# Patient Record
Sex: MALE | NOT HISPANIC OR LATINO | URBAN - METROPOLITAN AREA
[De-identification: names, ages, dates, MRNs, and addresses within clinical notes are randomized per-mention and may not be internally consistent; named-entity substitution may affect disease eponyms.]

---

## 2025-04-09 NOTE — PROGRESS NOTES
"Subjective   Mateusz Nieto is a 34 y.o. male who presents for NPV TO ESTABLISH PCP CARE and FOR ANNUAL PHYSICAL EXAM.    HPI:      34 y.o. male SMOKER (1.5 ppd x  15 years; quit 2020) who presents for NPV TO ESTABLISH PCP CARE and FOR ANNUAL PHYSICAL EXAM.       [facility] records reviewed    PMHx:  ADHD; previously on concerta over 10 years ago bit had stopped  Ex-smoker       Healthcare Providers:  Psychiatry:  none          Preventive Health Services:  -Last physical: 4/11/25  -last colonoscopy/cologuard: NEVER; paternal GM and GF 60s when diagnosed and passed of colon cancer; Denies any other Fhx colon cancer==> DUE at AGE 43 yo  -last STI screening: ?  -Hep C screening: ?       Immunizations:  -Childhood vaccines: completed per patient  -updated COVID spike vaccine: NOW DUE  -TDAP: ?; NOW DUE      Immunization History   Administered Date(s) Administered    COVID-19, mRNA, LNP-S, PF, 30 mcg/0.3 mL dose 01/10/2022    Moderna SARS-CoV-2 Vaccination 04/07/2021, 05/07/2021               Today Mateusz  reports:     - doing and feeling well     -not taking any prescription medications         -previously on concerta for ADHD but has been off medication for 15+ years and is interested in restarting.  He reports that he previously was prescribed wellbutrin that was not helpful and did not follow up to establish care with psychiatrist for ADHD evaluation          Today  Mateusz has no other reported complaints, issues, or problems.  ROS is NEG for HEADACHE, NAUSEA, VOMITING, DIARRHEA, CHEST PAIN, SOB, and BLEEDING.   Review of systems (12) is negative for all systems except for any identified issues in HPI above.          Objective     /80   Pulse 76   Temp 36.6 °C (97.9 °F)   Ht 1.765 m (5' 9.5\")   Wt 123 kg (272 lb 3.2 oz)   SpO2 95%   BMI 39.62 kg/m²      Physical Examination: patient declined chaperone       GENERAL           General Appearance: pleasant, well-appearing, well-developed, well-hydrated, " well-nourished, well-groomed.        HEENT           NECK supple, Neg for adneopathy no thyroid enlargement or nodules, Oropharynx normal no exudates. Ears: TMs intact, normal, no effusions       EYES           Pupils: PERRLA, no photophobia.        HEART           Rate and Rhythm regular rate and rhythm. Heart sounds: normal S1S2. Murmurs: none.        LUNGS           Effort: Normal chest wall, no pectus, Normal air entry all fields, Clear to IPPA, RR<16 with no use of accessory muscles.        BACK           General: unremarkable, no spinal tenderness or rashes.        ABDOMEN           General: soft NDNT to palpation, normal to inspection, no HSM, neg for LKKS or masses and BSs heard in all quadrants                  LYMPHATICS           Cervical: none. Axillary: none.        MUSCULOSKELETAL           gross abnormalities no gross abnormalities, no joint redness or swelling.        EXTREMITIES           Varicose veins: not present. Pulses: 2+ bilateral. Clubbing: none. Cyanosis: no.        NEUROLOGICAL           Orientation: alert and oriented x 3. Grossly normal: yes. Plantars: downgoing bilaterally. Muscle Bulk: normal . Cranial Nerves: CN's II-XII grossly intact.        PSYCHOLOGY           Affect: appropriate. Mood: pleasant.       SKIN: multiple atypical nevi on back, no ulceration and no bleeding. no rashes. No lesions.        (patient declined chaperone): Penis: normal circumcised phallus no lesions. Scrotum: no lesions. Testicles: descended bilaterally, non-tender to palpation, no palpable masses or inguinal hernias. No inguinal LAD.      Assessment/Plan   Problem List Items Addressed This Visit    None  Visit Diagnoses       Annual physical exam    -  Primary    Lipid screening        Diabetes mellitus screening        Attention deficit hyperactivity disorder (ADHD), unspecified ADHD type        High dependence on smoking        Vitamin D deficiency        Encounter for hepatitis C screening test for  low risk patient        Routine screening for STI (sexually transmitted infection)        Immunization counseling              Annual Physical Exam:  -labs ordered (see A/P above)    ADHD: off medications for 15+ years. No red flag signs/sxs  -referral to psychiatry for evaluation and treatment  -discussed with patient that he needs to be evaluated by psychiatry who will prescribe stimulant medication if indicated    Atypical Nevi:  -referral to dermatology ordered    BMI: 39  -weight loss encouraged  -lipid panel and HgBA1c ordered  -low carbohydrate, low fat diet, regularly exercise, and limit alcohol intake     Lipid Screening  -lipid panel ordered     Diabetes Screening  -HgBA1c ordered     Vitamin D deficiency  -Vit D levels ordered     Hep C screening  -Hep C antibody ordered     STI Screening: patient declined HIV, syphilis, GC/CT/trich ordered      Counseling:      Medication education:        Education:  The patient is counseled regarding potential side-effects of all new medications        Understanding:  Patient expressed understanding        Adherence:  No barriers to adherence identified        Immunizations Counseling  TDAP now due==> declined  -recommend updated COVID spike vaccine that can be obtained at local pharmacy     FOLLOW-UP: 4 weeks to discuss and review test results    I have personally reviewed all available pertinent labs, imaging, and consult notes with the patient.     Discussed recommended plan of care with patient. Patient expressed understanding and agreement with plan of care. All of patient's  questions were answered at the time. Patient had no additional questions at the time.           Leilani Diehl M.D.

## 2025-04-11 ENCOUNTER — OFFICE VISIT (OUTPATIENT)
Dept: PRIMARY CARE | Facility: CLINIC | Age: 35
End: 2025-04-11
Payer: COMMERCIAL

## 2025-04-11 VITALS
HEIGHT: 70 IN | SYSTOLIC BLOOD PRESSURE: 124 MMHG | HEART RATE: 76 BPM | WEIGHT: 272.2 LBS | TEMPERATURE: 97.9 F | DIASTOLIC BLOOD PRESSURE: 80 MMHG | OXYGEN SATURATION: 95 % | BODY MASS INDEX: 38.97 KG/M2

## 2025-04-11 DIAGNOSIS — Z13.1 DIABETES MELLITUS SCREENING: ICD-10-CM

## 2025-04-11 DIAGNOSIS — Z13.220 LIPID SCREENING: ICD-10-CM

## 2025-04-11 DIAGNOSIS — Z00.00 ANNUAL PHYSICAL EXAM: Primary | ICD-10-CM

## 2025-04-11 DIAGNOSIS — Z71.85 IMMUNIZATION COUNSELING: ICD-10-CM

## 2025-04-11 DIAGNOSIS — Z11.3 ROUTINE SCREENING FOR STI (SEXUALLY TRANSMITTED INFECTION): ICD-10-CM

## 2025-04-11 DIAGNOSIS — F90.9 ATTENTION DEFICIT HYPERACTIVITY DISORDER (ADHD), UNSPECIFIED ADHD TYPE: ICD-10-CM

## 2025-04-11 DIAGNOSIS — D22.9 ATYPICAL NEVI: ICD-10-CM

## 2025-04-11 DIAGNOSIS — Z11.59 ENCOUNTER FOR HEPATITIS C SCREENING TEST FOR LOW RISK PATIENT: ICD-10-CM

## 2025-04-11 DIAGNOSIS — E55.9 VITAMIN D DEFICIENCY: ICD-10-CM

## 2025-04-11 ASSESSMENT — PAIN SCALES - GENERAL: PAINLEVEL_OUTOF10: 0-NO PAIN

## 2025-04-11 ASSESSMENT — COLUMBIA-SUICIDE SEVERITY RATING SCALE - C-SSRS
6. HAVE YOU EVER DONE ANYTHING, STARTED TO DO ANYTHING, OR PREPARED TO DO ANYTHING TO END YOUR LIFE?: NO
1. IN THE PAST MONTH, HAVE YOU WISHED YOU WERE DEAD OR WISHED YOU COULD GO TO SLEEP AND NOT WAKE UP?: NO
2. HAVE YOU ACTUALLY HAD ANY THOUGHTS OF KILLING YOURSELF?: NO

## 2025-04-11 ASSESSMENT — PATIENT HEALTH QUESTIONNAIRE - PHQ9
2. FEELING DOWN, DEPRESSED OR HOPELESS: NOT AT ALL
1. LITTLE INTEREST OR PLEASURE IN DOING THINGS: NOT AT ALL
SUM OF ALL RESPONSES TO PHQ9 QUESTIONS 1 AND 2: 0

## 2025-04-21 LAB
APPEARANCE UR: CLEAR
BILIRUB UR QL STRIP: NEGATIVE
COLOR UR: YELLOW
GLUCOSE UR QL STRIP: NEGATIVE
HGB UR QL STRIP: NEGATIVE
KETONES UR QL STRIP: NEGATIVE
LEUKOCYTE ESTERASE UR QL STRIP: NEGATIVE
NITRITE UR QL STRIP: NEGATIVE
PH UR STRIP: 5.5 [PH] (ref 5–8)
PROT UR QL STRIP: NEGATIVE
SP GR UR STRIP: 1.02 (ref 1–1.03)

## 2025-04-22 LAB
25(OH)D3+25(OH)D2 SERPL-MCNC: 24 NG/ML (ref 30–100)
ALBUMIN SERPL-MCNC: 4.6 G/DL (ref 3.6–5.1)
ALP SERPL-CCNC: 113 U/L (ref 36–130)
ALT SERPL-CCNC: 24 U/L (ref 9–46)
ANION GAP SERPL CALCULATED.4IONS-SCNC: 7 MMOL/L (CALC) (ref 7–17)
AST SERPL-CCNC: 18 U/L (ref 10–40)
BASOPHILS # BLD AUTO: 60 CELLS/UL (ref 0–200)
BASOPHILS NFR BLD AUTO: 0.8 %
BILIRUB SERPL-MCNC: 0.4 MG/DL (ref 0.2–1.2)
BUN SERPL-MCNC: 18 MG/DL (ref 7–25)
CALCIUM SERPL-MCNC: 9.4 MG/DL (ref 8.6–10.3)
CHLORIDE SERPL-SCNC: 101 MMOL/L (ref 98–110)
CHOLEST SERPL-MCNC: 169 MG/DL
CHOLEST/HDLC SERPL: 3.8 (CALC)
CO2 SERPL-SCNC: 30 MMOL/L (ref 20–32)
CREAT SERPL-MCNC: 1 MG/DL (ref 0.6–1.26)
EGFRCR SERPLBLD CKD-EPI 2021: 100 ML/MIN/1.73M2
EOSINOPHIL # BLD AUTO: 203 CELLS/UL (ref 15–500)
EOSINOPHIL NFR BLD AUTO: 2.7 %
ERYTHROCYTE [DISTWIDTH] IN BLOOD BY AUTOMATED COUNT: 13.1 % (ref 11–15)
EST. AVERAGE GLUCOSE BLD GHB EST-MCNC: 120 MG/DL
EST. AVERAGE GLUCOSE BLD GHB EST-SCNC: 6.6 MMOL/L
GLUCOSE SERPL-MCNC: 90 MG/DL (ref 65–99)
HBA1C MFR BLD: 5.8 %
HCT VFR BLD AUTO: 48 % (ref 38.5–50)
HCV AB SERPL QL IA: NORMAL
HDLC SERPL-MCNC: 44 MG/DL
HGB BLD-MCNC: 15.8 G/DL (ref 13.2–17.1)
LDLC SERPL CALC-MCNC: 102 MG/DL (CALC)
LYMPHOCYTES # BLD AUTO: 2213 CELLS/UL (ref 850–3900)
LYMPHOCYTES NFR BLD AUTO: 29.5 %
MCH RBC QN AUTO: 28.5 PG (ref 27–33)
MCHC RBC AUTO-ENTMCNC: 32.9 G/DL (ref 32–36)
MCV RBC AUTO: 86.5 FL (ref 80–100)
MONOCYTES # BLD AUTO: 503 CELLS/UL (ref 200–950)
MONOCYTES NFR BLD AUTO: 6.7 %
NEUTROPHILS # BLD AUTO: 4523 CELLS/UL (ref 1500–7800)
NEUTROPHILS NFR BLD AUTO: 60.3 %
NONHDLC SERPL-MCNC: 125 MG/DL (CALC)
PLATELET # BLD AUTO: 213 THOUSAND/UL (ref 140–400)
PMV BLD REES-ECKER: 10.8 FL (ref 7.5–12.5)
POTASSIUM SERPL-SCNC: 5 MMOL/L (ref 3.5–5.3)
PROT SERPL-MCNC: 7.2 G/DL (ref 6.1–8.1)
RBC # BLD AUTO: 5.55 MILLION/UL (ref 4.2–5.8)
SODIUM SERPL-SCNC: 138 MMOL/L (ref 135–146)
TRIGL SERPL-MCNC: 125 MG/DL
TSH SERPL-ACNC: 1.6 MIU/L (ref 0.4–4.5)
WBC # BLD AUTO: 7.5 THOUSAND/UL (ref 3.8–10.8)

## 2025-05-18 NOTE — PROGRESS NOTES
Subjective   Mateusz Nieto is a 35 y.o. male  EX-SMOKER (1.5 ppd x 15 years; quit 2020) who presents for follow up to discuss and review test results.    HPI:    35 y.o. male  EX-SMOKER (1.5 ppd x 15 years; quit 2020) who presents for follow up to discuss and review test results.        [facility] records reviewed    Last seen by me on 4/11/25 for NPV TO ESTABLISH PCP CARE and FOR ANNUAL PHYSICAL EXAM. At visit:    Annual Physical Exam:  -labs ordered (see A/P above)     ADHD: off medications for 15+ years. No red flag signs/sxs  -referral to psychiatry for evaluation and treatment  -discussed with patient that he needs to be evaluated by psychiatry who will prescribe stimulant medication if indicated     Atypical Nevi:  -referral to dermatology ordered     BMI: 39  -weight loss encouraged  -lipid panel and HgBA1c ordered  -low carbohydrate, low fat diet, regularly exercise, and limit alcohol intake      Lipid Screening  -lipid panel ordered     Diabetes Screening  -HgBA1c ordered     Vitamin D deficiency  -Vit D levels ordered     Hep C screening  -Hep C antibody ordered     STI Screening: patient declined HIV, syphilis, GC/CT/trich ordered           Immunizations Counseling  TDAP now due==> declined  -recommend updated COVID spike vaccine that can be obtained at local pharmacy     FOLLOW-UP: 4 weeks to discuss and review test results          PMHx:  Prediabetes  hyperlipidemia  ADHD; previously on concerta over 10 years ago bit had stopped  Ex-smoker        Healthcare Providers:  Psychiatry:  none           Preventive Health Services:  -Last physical: 4/11/25  -last colonoscopy/cologuard: NEVER; paternal GM and GF 60s when diagnosed and passed of colon cancer; Denies any other Fhx colon cancer==> DUE at AGE 45 yo  -last STI screening: declined 4/11/25  -Hep C screening: negative 4/11/25        Immunizations:  -Childhood vaccines: completed per patient  -updated COVID spike vaccine: NOW DUE  -TDAP: ?; NOW DUE         Immunization History   Administered Date(s) Administered    COVID-19, mRNA, LNP-S, PF, 30 mcg/0.3 mL dose 01/10/2022    Moderna SARS-CoV-2 Vaccination 04/07/2021, 05/07/2021           Interval History:    -completed labs ordered 4/11/25. Based on test results:     -Vit D is slightly low. start OTC Vit D3 2000 units daily.     -prediabetes     -elevated LDL cholesterol is slightly elevated.    -TSH, CMP, CBC, UA normal.       Today Mateusz  reports:     - doing and feeling well     -not taking any prescription medications     -following a low carbohydrate, low cholesterol, and low fat diet         -previously on concerta for ADHD but has been off medication for 15+ years and is interested in restarting.  At last visit He reported that he previously was prescribed wellbutrin that was not helpful and did not follow up to establish care with psychiatrist for ADHD evaluation, and was referred to psychiatry for evaluation.           Today he has no other reported complaints, issues, or problems.  ROS is NEG for HEADACHE, NAUSEA, VOMITING, DIARRHEA, CHEST PAIN, SOB, and BLEEDING.   Review of systems (12) is negative for all systems except for any identified issues in HPI above.       Objective     /88   Pulse 80   Temp 36.3 °C (97.4 °F)   SpO2 97%      Physical Examination:       GENERAL           General Appearance: well-appearing, well-developed, well-hydrated, well-nourished, no acute distress.        HEENT           NECK supple, no masses or thyromegaly, no carotid bruit.        EYES           Extraocular Movements: normal, bilateral eyes SLIME, no conjunctival injection.        HEART           Rate and Rhythm regular rate and rhythm. Heart sounds: normal S1S2, no S3 or S4. Murmurs: none.        CHEST           Breath sounds: Clear to IPPA, RR<16 no use of accessory muscles.        ABDOMEN           General: Neg for LKKS or masses, no scleral icterus or jaundice.        MUSCULOSKELETAL           Joints  Demonstration: Neg for erythema, swelling or joint deformities. gross abnormalities no gross abnormalities.        EXTREMITIES           Lower Extremities: Neg for cyanosis, clubbing or edema.       Assessment/Plan   Problem List Items Addressed This Visit       Hyperlipidemia    Relevant Orders    Referral to Nutrition Services    Atypical nevi    Prediabetes - Primary    Relevant Orders    Referral to Nutrition Services    Vitamin D deficiency     Other Visit Diagnoses         Attention deficit hyperactivity disorder (ADHD), unspecified ADHD type          Class 2 obesity without serious comorbidity with body mass index (BMI) of 39.0 to 39.9 in adult, unspecified obesity type        Relevant Orders    Referral to Nutrition Services      Immunization counseling          Encounter for administration of vaccine        Relevant Orders    Tdap vaccine, age 7 years and older  (BOOSTRIX)          Prediabetes: HgBA1c 5.8 (4/21/25)  - Low carbohydrate, low-fat, low-cholesterol diet, regularly exercise, and limit alcohol intake  -Referral to nutrition ordered  - Hemoglobin A1c next due 7/21/2025    Hyperlipidemia:  -Low carbohydrate, low-fat, low-cholesterol diet, regularly exercise, and limit alcohol intake    ADHD: off medications for 15+ years. No red flag signs/sxs  -referral to psychiatry ordered  4/11/25 for evaluation and treatment  -discussed with patient that he needs to be evaluated by psychiatry who will prescribe stimulant medication if indicated and appropriate     Atypical Nevi:  -referral to dermatology ordered 4/11/25     BMI: 39  -weight loss encouraged  -Referral to nutrition ordered  -declined referral to endocrinology weight management  -low carbohydrate, low fat diet, regularly exercise, and limit alcohol intake        Vitamin D deficiency  - Start OTC Vit D 2000 units daily           Counseling:      Medication education:        Education:  The patient is counseled regarding potential side-effects of  all new medications        Understanding:  Patient expressed understanding        Adherence:  No barriers to adherence identified        Immunizations Counseling  TDAP now due==> received today  -recommend updated COVID spike vaccine that can be obtained at local pharmacy     FOLLOW-UP: 8 weeks HgBA1c check     I have personally reviewed all available pertinent labs, imaging, and consult notes with the patient.     Discussed recommended plan of care with patient. Patient expressed understanding and agreement with plan of care. All of patient's  questions were answered at the time. Patient had no additional questions at the time.            Leilani Diehl MD, PhD

## 2025-05-20 ENCOUNTER — OFFICE VISIT (OUTPATIENT)
Dept: PRIMARY CARE | Facility: CLINIC | Age: 35
End: 2025-05-20
Payer: COMMERCIAL

## 2025-05-20 VITALS
HEART RATE: 80 BPM | OXYGEN SATURATION: 97 % | TEMPERATURE: 97.4 F | SYSTOLIC BLOOD PRESSURE: 116 MMHG | DIASTOLIC BLOOD PRESSURE: 88 MMHG

## 2025-05-20 DIAGNOSIS — E55.9 VITAMIN D DEFICIENCY: ICD-10-CM

## 2025-05-20 DIAGNOSIS — F90.9 ATTENTION DEFICIT HYPERACTIVITY DISORDER (ADHD), UNSPECIFIED ADHD TYPE: ICD-10-CM

## 2025-05-20 DIAGNOSIS — E66.812 CLASS 2 OBESITY WITHOUT SERIOUS COMORBIDITY WITH BODY MASS INDEX (BMI) OF 39.0 TO 39.9 IN ADULT, UNSPECIFIED OBESITY TYPE: ICD-10-CM

## 2025-05-20 DIAGNOSIS — Z23 ENCOUNTER FOR ADMINISTRATION OF VACCINE: ICD-10-CM

## 2025-05-20 DIAGNOSIS — R73.03 PREDIABETES: Primary | ICD-10-CM

## 2025-05-20 DIAGNOSIS — E78.2 MIXED HYPERLIPIDEMIA: ICD-10-CM

## 2025-05-20 DIAGNOSIS — D22.9 ATYPICAL NEVI: ICD-10-CM

## 2025-05-20 DIAGNOSIS — Z71.85 IMMUNIZATION COUNSELING: ICD-10-CM

## 2025-05-20 PROBLEM — E78.5 HYPERLIPIDEMIA: Status: ACTIVE | Noted: 2025-05-20

## 2025-05-20 PROCEDURE — 90471 IMMUNIZATION ADMIN: CPT | Performed by: FAMILY MEDICINE

## 2025-05-20 PROCEDURE — 1036F TOBACCO NON-USER: CPT | Performed by: FAMILY MEDICINE

## 2025-05-20 PROCEDURE — 90715 TDAP VACCINE 7 YRS/> IM: CPT | Performed by: FAMILY MEDICINE

## 2025-05-20 PROCEDURE — 99214 OFFICE O/P EST MOD 30 MIN: CPT | Performed by: FAMILY MEDICINE

## 2025-05-20 ASSESSMENT — PATIENT HEALTH QUESTIONNAIRE - PHQ9
2. FEELING DOWN, DEPRESSED OR HOPELESS: NOT AT ALL
SUM OF ALL RESPONSES TO PHQ9 QUESTIONS 1 AND 2: 0
1. LITTLE INTEREST OR PLEASURE IN DOING THINGS: NOT AT ALL

## 2025-05-20 ASSESSMENT — COLUMBIA-SUICIDE SEVERITY RATING SCALE - C-SSRS
6. HAVE YOU EVER DONE ANYTHING, STARTED TO DO ANYTHING, OR PREPARED TO DO ANYTHING TO END YOUR LIFE?: NO
2. HAVE YOU ACTUALLY HAD ANY THOUGHTS OF KILLING YOURSELF?: NO
1. IN THE PAST MONTH, HAVE YOU WISHED YOU WERE DEAD OR WISHED YOU COULD GO TO SLEEP AND NOT WAKE UP?: NO

## 2025-05-20 ASSESSMENT — PAIN SCALES - GENERAL: PAINLEVEL_OUTOF10: 0-NO PAIN

## 2025-07-20 NOTE — PROGRESS NOTES
Subjective   Mateusz Nieto is a 35 y.o. male who presents for  EX-SMOKER (1.5 ppd x 15 years; quit 2020) who presents for follow up for prediabetes and other health issues and hemoglobin A1c check    HPI:      35 y.o. male who presents for  EX-SMOKER (1.5 ppd x 15 years; quit 2020) who presents for follow up for prediabetes and other health issues and hemoglobin A1c check     [facility] records reviewed    Last seen by me 5/20/25 for follow up to discuss and review test results. At visit:    Prediabetes: HgBA1c 5.8 (4/21/25)  - Low carbohydrate, low-fat, low-cholesterol diet, regularly exercise, and limit alcohol intake  -Referral to nutrition ordered  - Hemoglobin A1c next due 7/21/2025     Hyperlipidemia:  -Low carbohydrate, low-fat, low-cholesterol diet, regularly exercise, and limit alcohol intake     ADHD: off medications for 15+ years. No red flag signs/sxs  -referral to psychiatry ordered  4/11/25 for evaluation and treatment  -discussed with patient that he needs to be evaluated by psychiatry who will prescribe stimulant medication if indicated and appropriate     Atypical Nevi:  -referral to dermatology ordered 4/11/25     BMI: 39  -weight loss encouraged  -Referral to nutrition ordered  -declined referral to endocrinology weight management  -low carbohydrate, low fat diet, regularly exercise, and limit alcohol intake        Vitamin D deficiency  - Start OTC Vit D 2000 units daily            Immunizations Counseling  TDAP now due==> received today  -recommend updated COVID spike vaccine that can be obtained at local pharmacy     FOLLOW-UP: 8 weeks HgBA1c check       PMHx:  Prediabetes  hyperlipidemia  ADHD; previously on concerta over 10 years ago bit had stopped  Ex-smoker        Healthcare Providers:  Psychiatry:  none           Preventive Health Services:  -Last physical: 4/11/25  -last colonoscopy/cologuard: NEVER; paternal GM and GF 60s when diagnosed and passed of colon cancer; Denies any other Fhx  colon cancer==> DUE at AGE 45 yo  -last STI screening: declined 4/11/25  -Hep C screening: negative 4/11/25        Immunizations:  -Childhood vaccines: completed per patient  -updated COVID spike vaccine: NOW DUE       Immunization History   Administered Date(s) Administered    COVID-19, mRNA, LNP-S, PF, 30 mcg/0.3 mL dose 01/10/2022    Moderna SARS-CoV-2 Vaccination 04/07/2021, 05/07/2021    Tdap vaccine, age 7 year and older (BOOSTRIX, ADACEL) 05/20/2025           Interval History:     Today Mateusz  reports:     -sinus pressure, congestion and purulent nasal discharge x 4 days that he reports feels like a sinus infection.  He denies fevers/chills, headache, vision changes, neck pain, chest pain, cough, SOB, abdominal pain, diarrhea, malaise, or any known sick contact.     - doing and feeling well     -not taking any prescription medications      -following a low carbohydrate, low cholesterol, and low fat diet         -previously on concerta for ADHD but has been off medication for 15+ years and is interested in restarting.  At last visit He reported that he previously was prescribed wellbutrin that was not helpful and did not follow up to establish care with psychiatrist for ADHD evaluation, and was referred to psychiatry for evaluation.    -has scheduled appt with [facility]    - Does not have scheduled appointments with psychiatry, or nutrition; he reports that he will call and schedule these appointments           Today he has no other reported complaints, issues, or problems.  ROS is NEG for HEADACHE, NAUSEA, VOMITING, DIARRHEA, CHEST PAIN, SOB, and BLEEDING.   Review of systems (12) is negative for all systems except for any identified issues in HPI above.            Objective     /88   Pulse 75   Temp 36.2 °C (97.1 °F)   Wt 121 kg (266 lb)   SpO2 98%   BMI 38.72 kg/m²      Physical Examination:       GENERAL           General Appearance: well-appearing, well-developed, well-hydrated,  well-nourished, no acute distress.        HEENT           NECK supple, no masses or thyromegaly, no carotid bruit. NOSE: erythematous nasal turbinates with purulent nasal discharge. Maxillary sinus + TTP. Ears: TMs intact, normal, no effusions, no signs of infection.        EYES           Extraocular Movements: normal, bilateral eyes SLIME, no conjunctival injection.        HEART           Rate and Rhythm regular rate and rhythm. Heart sounds: normal S1S2, no S3 or S4. Murmurs: none.        CHEST           Breath sounds: Clear to IPPA, RR<16 no use of accessory muscles.        ABDOMEN           General: Neg for LKKS or masses, no scleral icterus or jaundice.        MUSCULOSKELETAL           Joints Demonstration: Neg for erythema, swelling or joint deformities. gross abnormalities no gross abnormalities.        EXTREMITIES           Lower Extremities: Neg for cyanosis, clubbing or edema.       Assessment/Plan   Problem List Items Addressed This Visit       Hyperlipidemia    Atypical nevi    Prediabetes - Primary    Relevant Orders    POCT glycosylated hemoglobin (Hb A1C) manually resulted (Completed)    Referral to Nutrition Services    Vitamin D deficiency     Other Visit Diagnoses         Attention deficit hyperactivity disorder (ADHD), unspecified ADHD type        Relevant Orders    Referral to Psychiatry      Class 2 obesity without serious comorbidity with body mass index (BMI) of 39.0 to 39.9 in adult, unspecified obesity type        Relevant Orders    Referral to Nutrition Services      Immunization counseling          Acute maxillary sinusitis, recurrence not specified        Relevant Medications    amoxicillin-clavulanate (Augmentin) 875-125 mg tablet    methylPREDNISolone (Medrol Dospak) 4 mg tablets      Seasonal allergies        Relevant Medications    fluticasone (Flonase) 50 mcg/actuation nasal spray    cetirizine (ZyrTEC) 10 mg tablet            Prediabetes: HgBA1c:  5.5 today (7/22/25)<  5.8  (4/21/25)  - Low carbohydrate, low-fat, low-cholesterol diet, regularly exercise, and limit alcohol intake  -Referral to nutrition ordered 5/20/2025 to assist with dietary modifications  - Hemoglobin A1c next due 10/22/2025       Acute sinusitis with superimposed seasonal allergies: no red flag signs/sxs  -start augmentin bid x 10 days  -start medrol dose pack, cetirizine 10 mg daily, and flonase daily  -emergency Dept precautions discussed and reviewed with patient    Hyperlipidemia:  -Low carbohydrate, low-fat, low-cholesterol diet, regularly exercise, and limit alcohol intake  -Referral to nutrition ordered 5/20/2025 to assist with dietary modifications     ADHD: off medications for 15+ years. No red flag signs/sxs  -referral to psychiatry ordered  4/11/25 for evaluation and treatment  -discussed with patient that he needs to be evaluated by psychiatry who will prescribe stimulant medication if indicated and appropriate     Atypical Nevi:  -referral to dermatology ordered 4/11/25; has scheduled appt      BMI: 39  -weight loss encouraged  -Referral to nutrition ordered 5/20/2025 to assist with weight loss and dietary modifications  - Previously declined referral to endocrinology weight management  -low carbohydrate, low fat diet, regularly exercise, and limit alcohol intake        Vitamin D deficiency  - Continue OTC Vit D 2000 units daily           Counseling:      Medication education:        Education:  The patient is counseled regarding potential side-effects of all new medications        Understanding:  Patient expressed understanding        Adherence:  No barriers to adherence identified        Immunizations Counseling  -recommend updated COVID spike vaccine that can be obtained at local pharmacy     FOLLOW-UP: 12 weeks HgBA1c check     I have personally reviewed all available pertinent labs, imaging, and consult notes with the patient.     Discussed recommended plan of care with patient. Patient expressed  understanding and agreement with plan of care. All of patient's  questions were answered at the time. Patient had no additional questions at the time.            Leilani Diehl MD, PhD

## 2025-07-22 ENCOUNTER — OFFICE VISIT (OUTPATIENT)
Dept: PRIMARY CARE | Facility: CLINIC | Age: 35
End: 2025-07-22
Payer: COMMERCIAL

## 2025-07-22 VITALS
HEART RATE: 75 BPM | WEIGHT: 266 LBS | DIASTOLIC BLOOD PRESSURE: 88 MMHG | TEMPERATURE: 97.1 F | OXYGEN SATURATION: 98 % | SYSTOLIC BLOOD PRESSURE: 118 MMHG | BODY MASS INDEX: 38.72 KG/M2

## 2025-07-22 DIAGNOSIS — D22.9 ATYPICAL NEVI: ICD-10-CM

## 2025-07-22 DIAGNOSIS — E66.812 CLASS 2 OBESITY WITHOUT SERIOUS COMORBIDITY WITH BODY MASS INDEX (BMI) OF 39.0 TO 39.9 IN ADULT, UNSPECIFIED OBESITY TYPE: ICD-10-CM

## 2025-07-22 DIAGNOSIS — E78.2 MIXED HYPERLIPIDEMIA: ICD-10-CM

## 2025-07-22 DIAGNOSIS — Z71.85 IMMUNIZATION COUNSELING: ICD-10-CM

## 2025-07-22 DIAGNOSIS — R73.03 PREDIABETES: Primary | ICD-10-CM

## 2025-07-22 DIAGNOSIS — F90.9 ATTENTION DEFICIT HYPERACTIVITY DISORDER (ADHD), UNSPECIFIED ADHD TYPE: ICD-10-CM

## 2025-07-22 DIAGNOSIS — J01.00 ACUTE MAXILLARY SINUSITIS, RECURRENCE NOT SPECIFIED: ICD-10-CM

## 2025-07-22 DIAGNOSIS — J30.2 SEASONAL ALLERGIES: ICD-10-CM

## 2025-07-22 DIAGNOSIS — E55.9 VITAMIN D DEFICIENCY: ICD-10-CM

## 2025-07-22 LAB — POC HEMOGLOBIN A1C: 5.5 % (ref 4.2–6.5)

## 2025-07-22 PROCEDURE — 99214 OFFICE O/P EST MOD 30 MIN: CPT | Performed by: FAMILY MEDICINE

## 2025-07-22 PROCEDURE — 83036 HEMOGLOBIN GLYCOSYLATED A1C: CPT | Performed by: FAMILY MEDICINE

## 2025-07-22 RX ORDER — AMOXICILLIN AND CLAVULANATE POTASSIUM 875; 125 MG/1; MG/1
875 TABLET, FILM COATED ORAL 2 TIMES DAILY
Qty: 20 TABLET | Refills: 0 | Status: SHIPPED | OUTPATIENT
Start: 2025-07-22 | End: 2025-08-01

## 2025-07-22 RX ORDER — METHYLPREDNISOLONE 4 MG/1
TABLET ORAL
Qty: 21 TABLET | Refills: 0 | Status: SHIPPED | OUTPATIENT
Start: 2025-07-22 | End: 2025-07-28

## 2025-07-22 RX ORDER — CETIRIZINE HYDROCHLORIDE 10 MG/1
10 TABLET ORAL DAILY
Qty: 90 TABLET | Refills: 3 | Status: SHIPPED | OUTPATIENT
Start: 2025-07-22 | End: 2026-07-22

## 2025-07-22 RX ORDER — FLUTICASONE PROPIONATE 50 MCG
1 SPRAY, SUSPENSION (ML) NASAL DAILY
Qty: 16 G | Refills: 11 | Status: SHIPPED | OUTPATIENT
Start: 2025-07-22 | End: 2026-07-22

## 2025-07-22 ASSESSMENT — PATIENT HEALTH QUESTIONNAIRE - PHQ9
1. LITTLE INTEREST OR PLEASURE IN DOING THINGS: NOT AT ALL
2. FEELING DOWN, DEPRESSED OR HOPELESS: NOT AT ALL
SUM OF ALL RESPONSES TO PHQ9 QUESTIONS 1 AND 2: 0

## 2025-07-22 ASSESSMENT — COLUMBIA-SUICIDE SEVERITY RATING SCALE - C-SSRS
2. HAVE YOU ACTUALLY HAD ANY THOUGHTS OF KILLING YOURSELF?: NO
6. HAVE YOU EVER DONE ANYTHING, STARTED TO DO ANYTHING, OR PREPARED TO DO ANYTHING TO END YOUR LIFE?: NO
1. IN THE PAST MONTH, HAVE YOU WISHED YOU WERE DEAD OR WISHED YOU COULD GO TO SLEEP AND NOT WAKE UP?: NO

## 2025-07-22 ASSESSMENT — PAIN SCALES - GENERAL: PAINLEVEL_OUTOF10: 0-NO PAIN
